# Patient Record
Sex: FEMALE | Race: BLACK OR AFRICAN AMERICAN
[De-identification: names, ages, dates, MRNs, and addresses within clinical notes are randomized per-mention and may not be internally consistent; named-entity substitution may affect disease eponyms.]

---

## 2020-06-25 ENCOUNTER — ERX REFILL RESPONSE (OUTPATIENT)
Age: 85
End: 2020-06-25

## 2020-06-25 RX ORDER — METOPROLOL SUCCINATE 25 MG/1
TAKE ONE TABLET BY MOUTH DAILY TABLET, EXTENDED RELEASE ORAL
Qty: 90 | Refills: 0

## 2020-06-25 RX ORDER — FUROSEMIDE 40 MG/1
TAKE ONE TABLET BY MOUTH DAILY TABLET ORAL
Qty: 90 | Refills: 0

## 2020-07-22 ENCOUNTER — ERX REFILL RESPONSE (OUTPATIENT)
Age: 85
End: 2020-07-22

## 2020-07-22 RX ORDER — CALCITRIOL 0.5 UG/1
TAKE ONE CAPSULE BY MOUTH EVERY OTHER DAY CAPSULE, LIQUID FILLED ORAL
Qty: 45 | Refills: 1

## 2020-08-11 ENCOUNTER — TELEPHONE ENCOUNTER (OUTPATIENT)
Dept: URBAN - METROPOLITAN AREA CLINIC 92 | Facility: CLINIC | Age: 85
End: 2020-08-11

## 2020-09-17 ENCOUNTER — ERX REFILL RESPONSE (OUTPATIENT)
Age: 85
End: 2020-09-17

## 2020-09-17 RX ORDER — METOPROLOL SUCCINATE 25 MG/1
TAKE ONE TABLET BY MOUTH DAILY TABLET, EXTENDED RELEASE ORAL
Qty: 90 | Refills: 0

## 2020-09-17 RX ORDER — FUROSEMIDE 40 MG/1
TAKE ONE TABLET BY MOUTH DAILY TABLET ORAL
Qty: 90 | Refills: 0

## 2020-09-28 ENCOUNTER — TELEPHONE ENCOUNTER (OUTPATIENT)
Dept: URBAN - METROPOLITAN AREA CLINIC 92 | Facility: CLINIC | Age: 85
End: 2020-09-28

## 2020-10-20 ENCOUNTER — OFFICE VISIT (OUTPATIENT)
Dept: URBAN - METROPOLITAN AREA TELEHEALTH 2 | Facility: TELEHEALTH | Age: 85
End: 2020-10-20

## 2020-10-22 ENCOUNTER — OFFICE VISIT (OUTPATIENT)
Dept: URBAN - METROPOLITAN AREA TELEHEALTH 2 | Facility: TELEHEALTH | Age: 85
End: 2020-10-22
Payer: MEDICARE

## 2020-10-22 DIAGNOSIS — I11.0 HYPERTENSIVE HEART DISEASE WITH HEART FAILURE: ICD-10-CM

## 2020-10-22 DIAGNOSIS — N18.31 STAGE 3A CHRONIC KIDNEY DISEASE: ICD-10-CM

## 2020-10-22 DIAGNOSIS — E78.2 MIXED HYPERLIPIDEMIA: ICD-10-CM

## 2020-10-22 DIAGNOSIS — I50.89 OTHER HEART FAILURE: ICD-10-CM

## 2020-10-22 PROBLEM — 5148006: Status: ACTIVE | Noted: 2020-10-22

## 2020-10-22 PROBLEM — 267434003: Status: ACTIVE | Noted: 2020-10-22

## 2020-10-22 PROBLEM — 84114007: Status: ACTIVE | Noted: 2020-10-22

## 2020-10-22 PROCEDURE — 99213 OFFICE O/P EST LOW 20 MIN: CPT | Performed by: INTERNAL MEDICINE

## 2020-10-22 RX ORDER — METOPROLOL SUCCINATE 25 MG/1
TAKE ONE TABLET BY MOUTH DAILY TABLET, EXTENDED RELEASE ORAL
Qty: 90 | Refills: 0 | Status: ACTIVE | COMMUNITY

## 2020-10-22 RX ORDER — LATANOPROST/PF 0.005 %
INSTILL 1 DROP INTO AFFECTED EYE(S) BY OPHTHALMIC ROUTE ONCE DAILY IN THE EVENING DROPS OPHTHALMIC (EYE) 1
Qty: 1 | Refills: 0 | Status: ACTIVE | COMMUNITY
Start: 1900-01-01 | End: 1900-01-01

## 2020-10-22 RX ORDER — FUROSEMIDE 40 MG/1
TAKE ONE TABLET BY MOUTH DAILY TABLET ORAL
Qty: 90 | Refills: 0 | Status: ACTIVE | COMMUNITY

## 2020-10-22 RX ORDER — METOPROLOL SUCCINATE 50 MG/1
TAKE ONE TABLET BY MOUTH DAILY TABLET, EXTENDED RELEASE ORAL
Qty: 30 | Refills: 11 | Status: ACTIVE | COMMUNITY
Start: 2016-05-02 | End: 1900-01-01

## 2020-10-22 RX ORDER — PANTOPRAZOLE SODIUM 40 MG
TAKE 1 TABLET (40 MG) BY ORAL ROUTE ONCE DAILY FOR 30 DAYS TABLET, DELAYED RELEASE (ENTERIC COATED) ORAL 1
Qty: 30 | Refills: 4 | Status: ACTIVE | COMMUNITY
Start: 2017-09-28 | End: 1900-01-01

## 2020-10-22 RX ORDER — METOPROLOL SUCCINATE 50 MG/1
TABLET, EXTENDED RELEASE ORAL
Qty: 0 | Refills: 0 | Status: ACTIVE | COMMUNITY
Start: 1900-01-01 | End: 1900-01-01

## 2020-10-22 RX ORDER — FUROSEMIDE 40 MG/1
TAKE ONE TABLET BY MOUTH DAILY TABLET ORAL
Qty: 90 | Refills: 1 | Status: ACTIVE | COMMUNITY
Start: 2019-06-28 | End: 1900-01-01

## 2020-10-22 RX ORDER — CALCITRIOL 0.5 UG/1
TAKE ONE CAPSULE BY MOUTH EVERY OTHER DAY CAPSULE, LIQUID FILLED ORAL
Qty: 45 | Refills: 1 | Status: ACTIVE | COMMUNITY

## 2020-10-22 RX ORDER — ALBUTEROL SULFATE 90 UG/1
INHALE ONE PUFF BY MOUTH EVERY 6 HOURS AEROSOL, METERED RESPIRATORY (INHALATION)
Qty: 25.5 | Refills: 3 | Status: ACTIVE | COMMUNITY
Start: 2019-12-19 | End: 1900-01-01

## 2020-10-22 RX ORDER — AMLODIPINE BESYLATE 5 MG/1
TAKE 1 TABLET (5 MG) BY ORAL ROUTE ONCE DAILY FOR 90 DAYS TABLET ORAL 1
Qty: 90 | Refills: 2 | Status: ACTIVE | COMMUNITY
Start: 2017-11-08 | End: 1900-01-01

## 2020-10-22 NOTE — HPI-TODAY'S VISIT:
89-year-old female alert and oriented with past history of atherosclerotic heart disease inactive at the present time.  Hypertensive cardiovascular disease well-controlled on amlodipine.  History of heart failure well-controlled on metoprolol furosemide.  History of hyperlipidemia and mild to moderate renal failure with no acute exacerbation. The patient is bright and alert in no acute distress.  Denies chest pain shortness of breath and leg edema.  Appetite is good.  Urination and defecation is normal.  The patient is sleep with a benign with no particular problems

## 2020-11-02 ENCOUNTER — ERX REFILL RESPONSE (OUTPATIENT)
Age: 85
End: 2020-11-02

## 2020-11-02 RX ORDER — CLOTRIMAZOLE AND BETAMETHASONE DIPROPIONATE 10; .5 MG/G; MG/G
APPLY TO AFFECTED AND SURROUNDING AREA(S) OF SKIN TWO TIMES A DAY; ONCE EVERY MORNING AND EVERY EVENING FOR 30 DAYS CREAM TOPICAL
Qty: 15 | Refills: 2

## 2020-11-13 ENCOUNTER — OUT OF OFFICE VISIT (OUTPATIENT)
Dept: URBAN - METROPOLITAN AREA MEDICAL CENTER 12 | Facility: MEDICAL CENTER | Age: 85
End: 2020-11-13
Payer: MEDICARE

## 2020-11-13 DIAGNOSIS — Z87.19 H/O SMALL BOWEL OBSTRUCTION: ICD-10-CM

## 2020-11-13 DIAGNOSIS — R41.82 ALTERED MENTAL STATUS: ICD-10-CM

## 2020-11-13 DIAGNOSIS — Z79.01 ANTICOAGULATED: ICD-10-CM

## 2020-11-13 PROCEDURE — G8427 DOCREV CUR MEDS BY ELIG CLIN: HCPCS | Performed by: INTERNAL MEDICINE

## 2020-11-13 PROCEDURE — 99222 1ST HOSP IP/OBS MODERATE 55: CPT | Performed by: INTERNAL MEDICINE

## 2020-11-24 ENCOUNTER — OFFICE VISIT (OUTPATIENT)
Dept: URBAN - METROPOLITAN AREA TELEHEALTH 2 | Facility: TELEHEALTH | Age: 85
End: 2020-11-24

## 2020-12-01 ENCOUNTER — DASHBOARD ENCOUNTERS (OUTPATIENT)
Age: 85
End: 2020-12-01

## 2020-12-01 ENCOUNTER — OFFICE VISIT (OUTPATIENT)
Dept: URBAN - METROPOLITAN AREA TELEHEALTH 2 | Facility: TELEHEALTH | Age: 85
End: 2020-12-01
Payer: MEDICARE

## 2020-12-01 DIAGNOSIS — I27.82 CHRONIC PULMONARY EMBOLISM, UNSPECIFIED PULMONARY EMBOLISM TYPE, UNSPECIFIED WHETHER ACUTE COR PULMONALE PRESENT: ICD-10-CM

## 2020-12-01 PROBLEM — 133971000119108: Status: ACTIVE | Noted: 2020-12-01

## 2020-12-01 PROCEDURE — 99213 OFFICE O/P EST LOW 20 MIN: CPT | Performed by: INTERNAL MEDICINE

## 2020-12-01 RX ORDER — ALBUTEROL SULFATE 90 UG/1
INHALE ONE PUFF BY MOUTH EVERY 6 HOURS AEROSOL, METERED RESPIRATORY (INHALATION)
Qty: 25.5 | Refills: 3 | Status: ACTIVE | COMMUNITY
Start: 2019-12-19

## 2020-12-01 RX ORDER — FUROSEMIDE 40 MG/1
TAKE ONE TABLET BY MOUTH DAILY TABLET ORAL
Qty: 90 | Refills: 1 | Status: ACTIVE | COMMUNITY
Start: 2019-06-28

## 2020-12-01 RX ORDER — AMLODIPINE BESYLATE 5 MG/1
TAKE 1 TABLET (5 MG) BY ORAL ROUTE ONCE DAILY FOR 90 DAYS TABLET ORAL 1
Qty: 90 | Refills: 2 | Status: ACTIVE | COMMUNITY
Start: 2017-11-08

## 2020-12-01 RX ORDER — CLOTRIMAZOLE AND BETAMETHASONE DIPROPIONATE 10; .5 MG/G; MG/G
APPLY TO AFFECTED AND SURROUNDING AREA(S) OF SKIN TWO TIMES A DAY; ONCE EVERY MORNING AND EVERY EVENING FOR 30 DAYS CREAM TOPICAL
Qty: 15 | Refills: 2 | Status: ACTIVE | COMMUNITY

## 2020-12-01 RX ORDER — METOPROLOL SUCCINATE 50 MG/1
TABLET, EXTENDED RELEASE ORAL
Qty: 0 | Refills: 0 | Status: ACTIVE | COMMUNITY
Start: 1900-01-01

## 2020-12-01 RX ORDER — CALCITRIOL 0.5 UG/1
TAKE ONE CAPSULE BY MOUTH EVERY OTHER DAY CAPSULE, LIQUID FILLED ORAL
Qty: 45 | Refills: 1 | Status: ACTIVE | COMMUNITY

## 2020-12-01 RX ORDER — METOPROLOL SUCCINATE 25 MG/1
TAKE ONE TABLET BY MOUTH DAILY TABLET, EXTENDED RELEASE ORAL
Qty: 90 | Refills: 0 | Status: ACTIVE | COMMUNITY

## 2020-12-01 RX ORDER — FUROSEMIDE 40 MG/1
TAKE ONE TABLET BY MOUTH DAILY TABLET ORAL
Qty: 90 | Refills: 0 | Status: ACTIVE | COMMUNITY

## 2020-12-01 RX ORDER — LATANOPROST/PF 0.005 %
INSTILL 1 DROP INTO AFFECTED EYE(S) BY OPHTHALMIC ROUTE ONCE DAILY IN THE EVENING DROPS OPHTHALMIC (EYE) 1
Qty: 1 | Refills: 0 | Status: ACTIVE | COMMUNITY
Start: 1900-01-01

## 2020-12-01 RX ORDER — PANTOPRAZOLE SODIUM 40 MG
TAKE 1 TABLET (40 MG) BY ORAL ROUTE ONCE DAILY FOR 30 DAYS TABLET, DELAYED RELEASE (ENTERIC COATED) ORAL 1
Qty: 30 | Refills: 4 | Status: ACTIVE | COMMUNITY
Start: 2017-09-28

## 2020-12-01 RX ORDER — METOPROLOL SUCCINATE 50 MG/1
TAKE ONE TABLET BY MOUTH DAILY TABLET, EXTENDED RELEASE ORAL
Qty: 30 | Refills: 11 | Status: ACTIVE | COMMUNITY
Start: 2016-05-02

## 2020-12-03 ENCOUNTER — TELEPHONE ENCOUNTER (OUTPATIENT)
Dept: URBAN - METROPOLITAN AREA CLINIC 92 | Facility: CLINIC | Age: 85
End: 2020-12-03

## 2020-12-21 ENCOUNTER — ERX REFILL RESPONSE (OUTPATIENT)
Age: 85
End: 2020-12-21

## 2020-12-21 RX ORDER — ATORVASTATIN CALCIUM 20 MG/1
TAKE ONE TABLET BY MOUTH DAILY TABLET, FILM COATED ORAL
Qty: 90 | Refills: 2

## 2021-01-28 ENCOUNTER — OUT OF OFFICE VISIT (OUTPATIENT)
Dept: URBAN - METROPOLITAN AREA MEDICAL CENTER 12 | Facility: MEDICAL CENTER | Age: 86
End: 2021-01-28
Payer: MEDICARE

## 2021-01-28 DIAGNOSIS — Z79.01 ANTICOAGULANT LONG-TERM USE: ICD-10-CM

## 2021-01-28 DIAGNOSIS — K92.1 BLACK STOOL: ICD-10-CM

## 2021-01-28 DIAGNOSIS — Z87.19 H/O ACUTE PANCREATITIS: ICD-10-CM

## 2021-01-28 DIAGNOSIS — D64.89 ANEMIA DUE TO OTHER CAUSE: ICD-10-CM

## 2021-01-28 PROCEDURE — G8427 DOCREV CUR MEDS BY ELIG CLIN: HCPCS | Performed by: INTERNAL MEDICINE

## 2021-01-28 PROCEDURE — 99222 1ST HOSP IP/OBS MODERATE 55: CPT | Performed by: INTERNAL MEDICINE

## 2021-01-29 ENCOUNTER — OUT OF OFFICE VISIT (OUTPATIENT)
Dept: URBAN - METROPOLITAN AREA MEDICAL CENTER 12 | Facility: MEDICAL CENTER | Age: 86
End: 2021-01-29
Payer: MEDICARE

## 2021-01-29 DIAGNOSIS — D13.2 ADENOMA OF DUODENUM: ICD-10-CM

## 2021-01-29 PROCEDURE — 43236 UPPR GI SCOPE W/SUBMUC INJ: CPT | Performed by: INTERNAL MEDICINE

## 2021-01-29 PROCEDURE — 43251 EGD REMOVE LESION SNARE: CPT | Performed by: INTERNAL MEDICINE

## 2021-02-12 ENCOUNTER — TELEPHONE ENCOUNTER (OUTPATIENT)
Dept: URBAN - METROPOLITAN AREA CLINIC 92 | Facility: CLINIC | Age: 86
End: 2021-02-12